# Patient Record
Sex: FEMALE | Race: WHITE | Employment: FULL TIME | URBAN - METROPOLITAN AREA
[De-identification: names, ages, dates, MRNs, and addresses within clinical notes are randomized per-mention and may not be internally consistent; named-entity substitution may affect disease eponyms.]

---

## 2017-05-23 PROCEDURE — 87624 HPV HI-RISK TYP POOLED RSLT: CPT | Performed by: OBSTETRICS & GYNECOLOGY

## 2017-05-23 PROCEDURE — 87491 CHLMYD TRACH DNA AMP PROBE: CPT | Performed by: OBSTETRICS & GYNECOLOGY

## 2017-05-23 PROCEDURE — 88175 CYTOPATH C/V AUTO FLUID REDO: CPT | Performed by: OBSTETRICS & GYNECOLOGY

## 2017-05-23 PROCEDURE — 87591 N.GONORRHOEAE DNA AMP PROB: CPT | Performed by: OBSTETRICS & GYNECOLOGY

## 2017-07-12 ENCOUNTER — HOSPITAL ENCOUNTER (OUTPATIENT)
Age: 27
Discharge: HOME | End: 2017-07-12
Attending: FAMILY MEDICINE
Payer: COMMERCIAL

## 2017-07-12 VITALS
OXYGEN SATURATION: 100 % | DIASTOLIC BLOOD PRESSURE: 99 MMHG | TEMPERATURE: 99 F | HEIGHT: 63 IN | BODY MASS INDEX: 48.55 KG/M2 | SYSTOLIC BLOOD PRESSURE: 143 MMHG | HEART RATE: 86 BPM | WEIGHT: 274 LBS | RESPIRATION RATE: 18 BRPM

## 2017-07-12 DIAGNOSIS — H65.02 ACUTE SEROUS OTITIS MEDIA OF LEFT EAR, RECURRENCE NOT SPECIFIED: Primary | ICD-10-CM

## 2017-07-12 PROCEDURE — 99213 OFFICE O/P EST LOW 20 MIN: CPT

## 2017-07-12 PROCEDURE — 99204 OFFICE O/P NEW MOD 45 MIN: CPT

## 2017-07-12 RX ORDER — AMOXICILLIN AND CLAVULANATE POTASSIUM 875; 125 MG/1; MG/1
1 TABLET, FILM COATED ORAL 2 TIMES DAILY
Qty: 20 TABLET | Refills: 0 | Status: SHIPPED | OUTPATIENT
Start: 2017-07-12 | End: 2017-07-22

## 2017-07-12 RX ORDER — PREDNISONE 20 MG/1
40 TABLET ORAL DAILY
Qty: 10 TABLET | Refills: 0 | Status: SHIPPED | OUTPATIENT
Start: 2017-07-12 | End: 2017-07-17

## 2017-07-12 NOTE — ED PROVIDER NOTES
Patient Seen in: [facility]    History   Patient presents with:  Ear Problem Pain (neurosensory)    Stated Complaint: left ear pain    HPI    22-year-old female presents to clinic today with chief complaints of clogged se Comment: Occassional smoker in past - quit  Alcohol use: Yes           1.0 oz/week     Standard drinks or equivalent: 2 per week     Comment: every other week        Review of Systems    Positive for stated complaint: left ear pain  Oth mg ×5 days  Follow-up with PCP in the next 1 week. Patient's blood pressure was slightly elevated. Recommend close monitoring for now  She also has grade 2-3 tonsils with a narrowed oropharynx patient does admit to snoring at night.   Recommend seeing ENT

## 2018-03-22 PROBLEM — J45.30 MILD PERSISTENT ASTHMA WITHOUT COMPLICATION: Status: ACTIVE | Noted: 2018-03-22

## 2019-11-20 PROBLEM — E66.01 CLASS 3 SEVERE OBESITY DUE TO EXCESS CALORIES WITHOUT SERIOUS COMORBIDITY WITH BODY MASS INDEX (BMI) OF 45.0 TO 49.9 IN ADULT (HCC): Status: ACTIVE | Noted: 2019-11-20

## 2021-06-04 PROBLEM — E28.2 PCOS (POLYCYSTIC OVARIAN SYNDROME): Status: ACTIVE | Noted: 2021-06-04
